# Patient Record
(demographics unavailable — no encounter records)

---

## 2025-04-09 NOTE — ASSESSMENT
[FreeTextEntry1] : 60 minutes was spent on medical discussion re: medication compliance. On antiretroviral treatment. Patient with HIV.  Will continue antiretroviral treatment. 60 minutes was spent on medical discussion re: medication compliance. On antiretroviral treatment. Patient with HIV.  Will continue antiretroviral treatment. to do labs TM 3 weeks with Rocio [phone]

## 2025-04-09 NOTE — HISTORY OF PRESENT ILLNESS
[FreeTextEntry1] : Recently hospitalized with HIV (undetect VL), DAVID due to malpositioned pepper, dementia, resolved DAVID, resolved volvulus.  Pt with hx PE on eliquis, asthma, HTN, COPD, CRE Kleb UTI (S/P Rx with Avycaz) Will order labs tm 3 weeks...Rocio Acharya (caregiver, power of )

## 2025-04-09 NOTE — PHYSICAL EXAM
[General Appearance - Alert] : alert [General Appearance - In No Acute Distress] : in no acute distress [Outer Ear] : the ears and nose were normal in appearance [Oropharynx] : the oropharynx was normal with no thrush [Neck Appearance] : the appearance of the neck was normal [Neck Cervical Mass (___cm)] : no neck mass was observed [Jugular Venous Distention Increased] : there was no jugular-venous distention [Thyroid Diffuse Enlargement] : the thyroid was not enlarged [Auscultation Breath Sounds / Voice Sounds] : lungs were clear to auscultation bilaterally [Heart Rate And Rhythm] : heart rate was normal and rhythm regular [Heart Sounds] : normal S1 and S2 [Heart Sounds Gallop] : no gallops [Murmurs] : no murmurs [Heart Sounds Pericardial Friction Rub] : no pericardial rub [Full Pulse] : the pedal pulses are present [Edema] : there was no peripheral edema [Bowel Sounds] : normal bowel sounds [Abdomen Soft] : soft [Abdomen Tenderness] : non-tender [Abdomen Mass (___ Cm)] : no abdominal mass palpated [Costovertebral Angle Tenderness] : no CVA tenderness [No Palpable Adenopathy] : no palpable adenopathy [Musculoskeletal - Swelling] : no joint swelling [Nail Clubbing] : no clubbing  or cyanosis of the fingernails [Motor Tone] : muscle strength and tone were normal [Skin Color & Pigmentation] : normal skin color and pigmentation [] : no rash [FreeTextEntry1] : demented

## 2025-05-23 NOTE — HISTORY OF PRESENT ILLNESS
[Home] : at home, [unfilled] , at the time of the visit. [Other Location: e.g. Home (Enter Location, City,State)___] : at [unfilled] [Telehealth (audio & video)] : This visit was provided via telehealth using real-time 2-way audio visual technology. [Family Member] : family member [FreeTextEntry3] : nibeni [FreeTextEntry1] : F/u post hospitalization at [facility] from 4/30 - 5/5 for abdominal pain  [de-identified] : Brief Hospital Course copied:67-year-old male history of Dementia, HIV, Hypertension, Hyperlipidemia, COPD, PE on Eliquis, indwelling Lee here for decreased output in the bag since today.  Patient's Niece Rocio also notes that he was found on the ground in morning but claims he did not fall.  Pt uses rolling walker and recently has been too weak to correctly use it and needed assistance.  Televisit with pt, his niece Rocio and RN Danita Brar.  RN reporting pt has not been doing well with dementia he is unable to explain himself. Pt with temp 100.3 - 99.3 congestion with runny nose, unclear if he was exposed to anyone who is sick. RN will call 911 for pt to be brought back to the hospital.

## 2025-05-23 NOTE — PLAN
[FreeTextEntry1] : RN calling 911 for pt to be taken to the hospital for temp of 100.3 to 99.3. Pt with dementia unable to state how he is feeling.

## 2025-05-23 NOTE — ASSESSMENT
[FreeTextEntry1] : 67-year-old male history of Dementia, HIV, Hypertension, Hyperlipidemia, COPD, PE on Eliquis, indwelling Lee here for decreased output in the bag since today.  Patient's Niece Rocio, also notes that he was found on the ground in morning but claims he did not fall.  Pt dc home with home care. TCM NP reviewed that pt is under the [facility] program for home care until pt is seen by PCP. TCM NP will be assigned to sign Home Care orders post-discharge.

## 2025-05-23 NOTE — DISCUSSION/SUMMARY
[FreeTextEntry1] : This patient is Enrolled in the Post-Discharge [facility] Home Care Services Follow Up Program through [facility] for Continuity of Care.  S/P Recent Hospitalization until seen by PCP.  Brief Hospital Course copied: 66-year-old male with PMHx HIV on Dovato and Pifeltro (CD4 1007 during last admission), history of UTI, reactive airway disease, dementia presents with complaints of right-sided chest and abdominal pain. Pt lives with his niece who is his HCP. Pt is able answer simple questions, niece provides most of the answers.  Niece reports pt has decreased appetite, has lost weight, memory changes noted. Encouraged small frequent meals of foods pt likes. CD4 level wnl. Pt to get new ID MD as is former MD is in [place] and he now resides in [place]. Pt has URO appt 7/9. Niece needs to schedule ID and PCP.

## 2025-05-23 NOTE — PHYSICAL EXAM
[Normal Outer Ear/Nose] : the outer ears and nose were normal in appearance [No JVD] : no jugular venous distention [No Respiratory Distress] : no respiratory distress  [No Accessory Muscle Use] : no accessory muscle use [de-identified] : pt is alert and awake but disoriented

## 2025-05-23 NOTE — HISTORY OF PRESENT ILLNESS
[Home] : at home, [unfilled] , at the time of the visit. [Other Location: e.g. Home (Enter Location, City,State)___] : at [unfilled] [Telehealth (audio & video)] : This visit was provided via telehealth using real-time 2-way audio visual technology. [Family Member] : family member [FreeTextEntry3] : nibeni [FreeTextEntry1] : F/u post hospitalization at [facility] from 4/30 - 5/5 for abdominal pain  [de-identified] : Brief Hospital Course copied:67-year-old male history of Dementia, HIV, Hypertension, Hyperlipidemia, COPD, PE on Eliquis, indwelling Lee here for decreased output in the bag since today.  Patient's Niece Rocio also notes that he was found on the ground in morning but claims he did not fall.  Pt uses rolling walker and recently has been too weak to correctly use it and needed assistance.  Televisit with pt, his niece Rocio and RN Danita Brar.  RN reporting pt has not been doing well with dementia he is unable to explain himself. Pt with temp 100.3 - 99.3 congestion with runny nose, unclear if he was exposed to anyone who is sick. RN will call 911 for pt to be brought back to the hospital.

## 2025-05-23 NOTE — PHYSICAL EXAM
[Normal Outer Ear/Nose] : the outer ears and nose were normal in appearance [No JVD] : no jugular venous distention [No Respiratory Distress] : no respiratory distress  [No Accessory Muscle Use] : no accessory muscle use [de-identified] : pt is alert and awake but disoriented

## 2025-05-23 NOTE — ASSESSMENT
[FreeTextEntry1] : 67-year-old male history of Dementia, HIV, Hypertension, Hyperlipidemia, COPD, PE on Eliquis, indwelling Lee here for decreased output in the bag since today.  Patient's Niece Rocio, also notes that he was found on the ground in morning but claims he did not fall.  Pt dc home with home care. TCM NP reviewed that pt is under the [facility] program for home care until pt is seen by PCP. TCM NP will be assigned to sign Home Care orders post-discharge.

## 2025-05-23 NOTE — DISCUSSION/SUMMARY
[FreeTextEntry1] : This patient is Enrolled in the Post-Discharge [facility] Home Care Services Follow Up Program through [facility] for Continuity of Care.  S/P Recent Hospitalization until seen by PCP.  Brief Hospital Course copied: 66-year-old male with PMHx HIV on Dovato and Pifeltro (CD4 1007 during last admission), history of UTI, reactive airway disease, dementia presents with complaints of right-sided chest and abdominal pain. Pt lives with his niece who is his HCP. Pt is able answer simple questions, niece provides most of the answers.  Niece reports pt has decreased appetite, has lost weight, memory changes noted. Encouraged small frequent meals of foods pt likes. CD4 level wnl. Pt to get new ID MD as is former MD is in [place] and he now resides in [place]. Pt has URO appt 7/9. Niece needs to schedule ID and PCP.